# Patient Record
(demographics unavailable — no encounter records)

---

## 2024-10-08 NOTE — HISTORY OF PRESENT ILLNESS
[FreeTextEntry1] : 51-year-old white female para 3 here for follow-up visit.  Patient has a large fibroid uterus with 2 myomas that are 6 and 3 cm in size.  Patient has significant pelvic pain and has some abnormal bleeding with negative endometrial biopsy.  She also has had ASCUS HPV high risk Pap with negative colposcopy.  Patient wishes to have definitive therapy in the form of a hysterectomy.  She has had 3 previous  sections.

## 2024-10-08 NOTE — DISCUSSION/SUMMARY
[FreeTextEntry1] : I discussed the clinical situation at length with the patient.  Pros and cons of hysterectomy were discussed and I discussed different modalities available to her.  Because of 3 previous  sections we discussed considering a laparotomy as well as secondary to her enlarged uterus.  Patient also wants her cervix removed secondary to history of HPV.  We discussed exploratory laparotomy with total abdominal hysterectomy and bilateral salpingectomy.  I discussed risks of bleeding infection as well as injury especially in light of 3 previous  sections.  All questions were answered and she will be scheduled.